# Patient Record
(demographics unavailable — no encounter records)

---

## 2025-06-26 NOTE — PHYSICAL EXAM
[FreeTextEntry3] :  Gen:                        Well appearing, well nourished, NAD. Skin:                       Eccrine:                 Within normal limits   Hair:                      Normal density and distribution   Scalp:                     Face:                      Neck:                      Chest:                     Abdomen:               Back:                      UE:                         LE:                           Neuro:                     No focal deficits. Age appropriate. Psych:                     Appropriate affect.

## 2025-06-26 NOTE — ASSESSMENT
[FreeTextEntry1] : -- NPA here for FSE. no personal or fhx skinCA notes wart L foot. using OTC sal acid without imp  #Verruca vulgaris - chronic, refractory -discussed with pt for in-office cryo tx - however cryo is out today. pt to return 1-2 wk to initiate -in office gentle curettage today only -plan start imiquimod alt EO night with OTC compound w (prefers gel over liq - liq dried out)  #Skin screening Skin condition screening performed today -Skin exam included the scalp, face, ears, neck, chest, abdomen, back,  and bilateral upper and lower extremities; exam of intergluteal cleft and genitals was deferred. -Dermoscopy as clinically indicated was performed. -Skin exam unremarkable, except as otherwise noted -Counseled re: skin exams and sun protection

## 2025-07-07 NOTE — HISTORY OF PRESENT ILLNESS
[FreeTextEntry1] : wart on L foot [de-identified] : Yoli lucas. OTC sal acid without imp. Aldara TIW started ~6-27-25. alt with sal acid

## 2025-07-07 NOTE — ASSESSMENT
[FreeTextEntry1] : --  #Verruca vulgaris - chronic, refractory Wart L foot. OTC sal acid without imp. Aldara TIW started ~6-27-25. alt with sal acid. well simón. denies SE plan start imiquimod alt EO night with OTC compound w (prefers gel over liq - liq dried out) in off cryo disc - pt would like to initiate today cryo #1 today   Cryo: R/b/a LN discussed. Verbal consent obtained. *1 lesions on the *L foot were treated today with 2 cycles of LN. Procedure was tolerated well without complication. After care discussed.

## 2025-07-07 NOTE — PHYSICAL EXAM
[FreeTextEntry3] :  Gen:                        Well appearing, well nourished, NAD. Skin:                       Eccrine:                 Within normal limits               Face:                              LE:                           Neuro:                     No focal deficits. Age appropriate. Psych:                     Appropriate affect.